# Patient Record
Sex: FEMALE | Race: WHITE | NOT HISPANIC OR LATINO | Employment: FULL TIME | URBAN - METROPOLITAN AREA
[De-identification: names, ages, dates, MRNs, and addresses within clinical notes are randomized per-mention and may not be internally consistent; named-entity substitution may affect disease eponyms.]

---

## 2023-10-03 ENCOUNTER — APPOINTMENT (OUTPATIENT)
Dept: ORTHOPEDIC SURGERY | Facility: CLINIC | Age: 33
End: 2023-10-03
Payer: COMMERCIAL

## 2023-10-03 PROBLEM — K21.9 ACID REFLUX: Status: ACTIVE | Noted: 2023-10-03

## 2023-10-03 PROBLEM — Z98.84 BARIATRIC SURGERY STATUS: Status: ACTIVE | Noted: 2023-10-03

## 2023-10-03 PROBLEM — D22.9 NUMEROUS MOLES: Status: ACTIVE | Noted: 2023-10-03

## 2023-10-03 PROBLEM — F33.9 DEPRESSION, RECURRENT (CMS-HCC): Status: ACTIVE | Noted: 2023-10-03

## 2023-10-03 PROBLEM — E88.810 METABOLIC SYNDROME: Status: ACTIVE | Noted: 2023-10-03

## 2023-10-03 PROBLEM — L03.90 CELLULITIS: Status: ACTIVE | Noted: 2023-10-03

## 2023-10-03 PROBLEM — S82.409A FIBULA FRACTURE: Status: ACTIVE | Noted: 2023-10-03

## 2023-10-03 PROBLEM — G97.1 HEADACHE FOLLOWING LUMBAR PUNCTURE: Status: ACTIVE | Noted: 2023-10-03

## 2023-10-03 PROBLEM — R63.2 POLYPHAGIA: Status: ACTIVE | Noted: 2023-10-03

## 2023-10-03 PROBLEM — H90.0 CONDUCTIVE HEARING LOSS OF BOTH EARS: Status: ACTIVE | Noted: 2023-10-03

## 2023-10-03 PROBLEM — R30.0 DYSURIA: Status: ACTIVE | Noted: 2023-10-03

## 2023-10-03 PROBLEM — S82.832A CLOSED FRACTURE OF LEFT DISTAL FIBULA: Status: ACTIVE | Noted: 2023-10-03

## 2023-10-03 PROBLEM — H66.40 OTITIS MEDIA, PURULENT: Status: ACTIVE | Noted: 2023-10-03

## 2023-10-03 PROBLEM — R78.79 HIGH BLOOD COPPER LEVEL: Status: ACTIVE | Noted: 2023-10-03

## 2023-10-03 PROBLEM — E55.9 VITAMIN D DEFICIENCY: Status: ACTIVE | Noted: 2023-10-03

## 2023-10-03 PROBLEM — S93.432A SYNDESMOTIC DISRUPTION OF LEFT ANKLE: Status: ACTIVE | Noted: 2023-10-03

## 2023-10-03 PROBLEM — G93.2 IIH (IDIOPATHIC INTRACRANIAL HYPERTENSION): Status: ACTIVE | Noted: 2023-10-03

## 2023-10-03 PROBLEM — R63.4 RAPID WEIGHT LOSS: Status: ACTIVE | Noted: 2023-10-03

## 2023-10-03 PROBLEM — S92.252A: Status: ACTIVE | Noted: 2023-10-03

## 2023-10-03 PROBLEM — G51.0 BELL'S PALSY: Status: ACTIVE | Noted: 2023-10-03

## 2023-10-03 PROBLEM — A59.9 TRICHOMONIASIS: Status: ACTIVE | Noted: 2023-10-03

## 2023-10-03 PROBLEM — S99.919A ANKLE INJURY: Status: ACTIVE | Noted: 2023-10-03

## 2023-10-03 PROBLEM — H60.90 OTITIS EXTERNA: Status: ACTIVE | Noted: 2023-10-03

## 2023-10-03 PROBLEM — S92.352A CLOSED DISPLACED FRACTURE OF FIFTH METATARSAL BONE OF LEFT FOOT: Status: ACTIVE | Noted: 2023-10-03

## 2023-10-03 PROBLEM — Z98.84 S/P LAPAROSCOPIC SLEEVE GASTRECTOMY: Status: ACTIVE | Noted: 2023-10-03

## 2023-10-03 PROBLEM — R29.818 SUSPECTED SLEEP APNEA: Status: ACTIVE | Noted: 2023-10-03

## 2023-10-03 PROBLEM — F41.1 GENERALIZED ANXIETY DISORDER: Status: ACTIVE | Noted: 2023-10-03

## 2023-10-03 PROBLEM — N64.52 BILATERAL NIPPLE DISCHARGE: Status: ACTIVE | Noted: 2023-10-03

## 2023-10-03 PROBLEM — S92.309A CLOSED FRACTURE OF METATARSAL BONE: Status: ACTIVE | Noted: 2023-10-03

## 2023-10-03 PROBLEM — K60.2 ANAL FISSURE: Status: ACTIVE | Noted: 2023-10-03

## 2023-10-03 PROBLEM — H47.10 OPTIC DISC EDEMA: Status: ACTIVE | Noted: 2023-10-03

## 2023-10-03 PROBLEM — R79.89 LOW SERUM PROLACTIN: Status: ACTIVE | Noted: 2023-10-03

## 2023-10-03 RX ORDER — MULTIVITAMIN
TABLET ORAL
COMMUNITY

## 2023-10-03 RX ORDER — OMEPRAZOLE 40 MG/1
1 CAPSULE, DELAYED RELEASE ORAL DAILY
COMMUNITY
Start: 2019-06-27 | End: 2023-10-25 | Stop reason: ALTCHOICE

## 2023-10-03 RX ORDER — SERTRALINE HYDROCHLORIDE 100 MG/1
1 TABLET, FILM COATED ORAL DAILY
COMMUNITY
Start: 2017-05-12 | End: 2023-10-25 | Stop reason: ALTCHOICE

## 2023-10-03 RX ORDER — NYSTATIN 100000 [USP'U]/G
POWDER TOPICAL
COMMUNITY
Start: 2021-05-11

## 2023-10-05 ENCOUNTER — OFFICE VISIT (OUTPATIENT)
Dept: ORTHOPEDIC SURGERY | Facility: CLINIC | Age: 33
End: 2023-10-05
Payer: COMMERCIAL

## 2023-10-05 DIAGNOSIS — S92.352A DISPLACED FRACTURE OF FIFTH METATARSAL BONE, LEFT FOOT, INITIAL ENCOUNTER FOR CLOSED FRACTURE: Primary | ICD-10-CM

## 2023-10-05 PROCEDURE — 99204 OFFICE O/P NEW MOD 45 MIN: CPT | Performed by: PHYSICIAN ASSISTANT

## 2023-10-05 PROCEDURE — 99214 OFFICE O/P EST MOD 30 MIN: CPT | Performed by: PHYSICIAN ASSISTANT

## 2023-10-05 NOTE — PATIENT INSTRUCTIONS
YOUR BOOT INSTRUCTIONS:  You CANNOT put weight on your foot and ankle while in the boot.    You can use crutches or walker for support   You should wear boot when walking or standing   You can take off your boot while bathing, sitting, stretching, icing or doing therapy exercises.  You can take your boot off at nighttime while sleeping.    You can also use OTC Voltaren gel or  aspercreme ointment and apply it to the injured area.      Ice and elevate supported at the calf to reduce swelling     Patient will increase their dietary calcium intake (milk,yogurt) and should get 1200 mg of calcium per day. They will also take a vitamin D supplement.    We have discussed your treatment options, risks and benefits and surgery for your condition was selected and recommended.  Additional laboratory studies, EKG and chest x-ray may need to be performed prior to surgery, particularly if you have chronic health problems.  We will give you an order to get further testing done if needed.  You may have to see your primary care doctor or the [facility] to be medically cleared (healthy enough to safely undergo surgery without further treatment for your medical conditions) prior to your surgery.  Please do not eat, drink, or chew anything after midnight the night before your surgery.  Please call Kate Chase at [phone] to schedule your surgery and if you have any further questions.    Follow up with Dr. Hicks

## 2023-10-05 NOTE — LETTER
October 10, 2023     Racquel Amaya DO  [address] [facility]  [address]  [facility] [address]    Patient: eLxie Hartmann   YOB: 1990   Date of Visit: 10/5/2023       Dear Dr. Racquel Amaya DO:    Thank you for referring Lexie Hartmann to me for evaluation. Below are my notes for this consultation.  If you have questions, please do not hesitate to call me. I look forward to following your patient along with you.       Sincerely,     Jarad Lin PA-C      CC: No Recipients  ______________________________________________________________________________________    NPV-   History of Present Illness  32 y.o.female  1. Displaced fracture of fifth metatarsal bone, left foot, initial encounter for closed fracture          Mechanism of injury: ankle/foot inversion on stairs  Date of Injury/Pain: 9/25/23  Location of pain: lateral ankle  Quality of pain:  7  Frequency of Pain: worse with walking or standing  Associated symptoms? Swelling.  Previous treatment?  Dr. Bustillo, xrays, walking boot    27 point review of systems negative except what is stated in HPI    On examination of the left ankle/foot:  Normal gait in boot  Normal alignment  Minimal swelling of the foot with ecchymosis. No erythema  Slight decreased ROM in plantarflexion, dorsiflexion, inversion and eversion and decrease 2nd through 5th toe ROM in extension and flexion  Normal strength in plantarflexion, dorsiflexion, inversion and eversion.  Tenderness to palpation: 5th metatarsal   No tenderness to palpation over the Achilles, medial malleolus, posterior tibial tendon, peroneal tendon, deltoid ligament, talus or navicular.  Neurovascularly intact.  No sensory deficit to light touch.  Dorsalis pedis and posterior tibial pulses 2+ bilaterally.  Negative proprioception testing.   - Cash's test                              - Dorsiflexion-eversion test  - Anterior Drawer test                        - Talar tilt test  - Squeeze test     I  personally reviewed the patient's x-ray images and reports of the left foot. The xrays show a displaced spiral fracture of the 5th metatarsal. No other fractures or dislocation.      ASSESSMENT: right displaced 5th metatarsal fracture    PLAN: Treatment options were discussed with the patient. Patient was placed in a tall CAM walker boot to be WBAT with crutches.  They were given boot instructions. Patient was given a handout and instructed on an at home stretching program.  They should do these exercises 3 times per day for 6 weeks and then daily. Patient can use OTC aspercream for pain and continue to ice and elevate supported at the calf to reduce swelling. All the patient's questions were answered. The patient agrees with the above plan.  Follow up with Dr. Hicks

## 2023-10-10 ENCOUNTER — PREP FOR PROCEDURE (OUTPATIENT)
Dept: ORTHOPEDIC SURGERY | Facility: CLINIC | Age: 33
End: 2023-10-10

## 2023-10-10 ENCOUNTER — OFFICE VISIT (OUTPATIENT)
Dept: ORTHOPEDIC SURGERY | Facility: CLINIC | Age: 33
End: 2023-10-10
Payer: COMMERCIAL

## 2023-10-10 DIAGNOSIS — S92.352A CLOSED DISPLACED FRACTURE OF FIFTH METATARSAL BONE OF LEFT FOOT, INITIAL ENCOUNTER: Primary | ICD-10-CM

## 2023-10-10 DIAGNOSIS — S92.352A DISPLACED FRACTURE OF FIFTH METATARSAL BONE, LEFT FOOT, INITIAL ENCOUNTER FOR CLOSED FRACTURE: Primary | ICD-10-CM

## 2023-10-10 PROCEDURE — 99214 OFFICE O/P EST MOD 30 MIN: CPT | Performed by: ORTHOPAEDIC SURGERY

## 2023-10-10 RX ORDER — SODIUM CHLORIDE 9 MG/ML
100 INJECTION, SOLUTION INTRAVENOUS CONTINUOUS
Status: CANCELLED | OUTPATIENT
Start: 2023-10-10

## 2023-10-10 NOTE — PROGRESS NOTES
NPV-   History of Present Illness  32 y.o.female  1. Displaced fracture of fifth metatarsal bone, left foot, initial encounter for closed fracture          Mechanism of injury: ankle/foot inversion on stairs  Date of Injury/Pain: 9/25/23  Location of pain: lateral ankle  Quality of pain:  7  Frequency of Pain: worse with walking or standing  Associated symptoms? Swelling.  Previous treatment?  Dr. Bustillo, xrays, walking boot    27 point review of systems negative except what is stated in HPI    On examination of the left ankle/foot:  Normal gait in boot  Normal alignment  Minimal swelling of the foot with ecchymosis. No erythema  Slight decreased ROM in plantarflexion, dorsiflexion, inversion and eversion and decrease 2nd through 5th toe ROM in extension and flexion  Normal strength in plantarflexion, dorsiflexion, inversion and eversion.  Tenderness to palpation: 5th metatarsal   No tenderness to palpation over the Achilles, medial malleolus, posterior tibial tendon, peroneal tendon, deltoid ligament, talus or navicular.  Neurovascularly intact.  No sensory deficit to light touch.  Dorsalis pedis and posterior tibial pulses 2+ bilaterally.  Negative proprioception testing.   - Cash's test                              - Dorsiflexion-eversion test  - Anterior Drawer test                        - Talar tilt test  - Squeeze test     I personally reviewed the patient's x-ray images and reports of the left foot. The xrays show a displaced spiral fracture of the 5th metatarsal. No other fractures or dislocation.      ASSESSMENT: right displaced 5th metatarsal fracture    PLAN: Treatment options were discussed with the patient. Patient was placed in a tall CAM walker boot to be WBAT with crutches.  They were given boot instructions. Patient was given a handout and instructed on an at home stretching program.  They should do these exercises 3 times per day for 6 weeks and then daily. Patient can use OTC aspercream for  pain and continue to ice and elevate supported at the calf to reduce swelling. All the patient's questions were answered. The patient agrees with the above plan.  Follow up with Dr. Hicks

## 2023-10-10 NOTE — PROGRESS NOTES
HISTORY OF PRESENT ILLNESS  This is a pleasant 32 y.o. year old female  who presents on 10/10/2023 at the request of Racquel Amaya DO for evaluation of left foot pain that has been present over/since  9/25/23 .    How the condition occurred or started: missed a step and twisted her left foot  Location of pain (patient points to): lateral foot left   Quality of pain: Moderate  Modifying Factors: worse with weightbearing  Associated Signs and symptoms: swelling  Previous Treatment: boot and knee walker  No other areas of injury    PHYSICAL EXAMINATION  Constitutional Exam: patient's height and weight reviewed, well-kempt  Psychiatric Exam: alert and oriented x 3, appropriate mood and behavior  Eye Exam: CORY, EOMI  Pulmonary Exam: breathing non-labored, no apparent distress  Lymphatic exam: no appreciable lymphadenopathy in the lower extremities  Cardiovascular exam: DP pulses 2+ bilaterally, PT 2+ bilaterally, toes are pink with good capillary refill, no pitting edema  Skin exam: no open lesions, rashes, abrasions or ulcerations  Neurological exam: sensation to light touch intact in both lower extremities in peripheral and dermatomal distributions (except for any abnormalities noted in musculoskeletal exam)    Musculoskeletal exam: left foot and ankle: sore to palpation over the 5th metatarsal where swelling located, no pain to palpation over distal fibula and medial malleolus, no pain over TMT or midfoot, neg shuck test, firm endpoint on anterior drawer test and talar tilt test, neg DF-eversion stress test, wiggles toes/DF/PF intact    DATA/RESULTS REVIEW: I personally reviewed the patient's x-ray images and reports of the the left foot and show significantly displaced 5th metatarsal neck and shaft fracture with comminution and significant shortening .     ASSESSMENT: left foot 5th metatarsal neck and shaft fracture with significant shortening and displacement  PLAN: I discussed with the patient the  differential diagnosis, complex traumatic related nature of the condition(s) and available treatment option(s).  Due to amount of displacement which is significant over 5 mm and significant shortening with malrotation of the toe, we would recommend open duction internal fixation of the fifth metatarsal neck and shaft fracture as likely there is muscle interposition in the fracture site which will prevent good healing.  Discussed with her the risks and benefits of nonoperative versus operative management in detail.  We discussed with her risks of surgery including pain, bleeding, infection, wound healing problems, bone healing problems, DVT PE, swelling, other medical complications.  She would like to proceed with surgical intervention.  She would have to be nonweightbearing on the left lower extremity with knee walker for at least 4 to 6 weeks after surgery and then transition to boot weightbearing after that.  Discussed with her that splint will be applied in the operating room after surgery is completed.  Discussed with her takes approximately 6 weeks to 8 weeks for bone healing.  Physical therapy will be needed to help get her back to good function and balance.  Outpatient procedure done under general anesthetic.  Popliteal block also can be done by anesthesia to decrease pain medication needed after surgery.  Reviewed with her the use of Percocet briefly only as needed for severe pain and we discussed DVT prophylaxis medications and decided on aspirin to start day 1 after surgery.  In the meantime she will continue using a knee walker and boot like a cast and have it on all the time.  She will contact my office to schedule.  The patient's questions were answered in detail.      I discussed with patient the procedure in detail, risks and benefits of procedure, post-op protocol, anesthetic used with possible regional block, timeline of recovery, need for protective weightbearing and/or bracing after procedure,  "pain management protocol, DVT prophylaxis protocol, home preparation suggestions, pre-op surgery preparation, and other pertinent information.    Note dictated with [employer] software, completed without full type editing to avoid delay.      Dear Referring Physician,  It was my pleasure to see your patient, in the office today.  Please refer to the detailed notes above for my findings and recommendations.  Thank you once again for your consultation request.  If you have any further questions or concerns, please feel free to contact me via email or at the phone number and address below.  Sincerely,    Milena Hicks MD   of Orthopedic Surgery, [employer]  Dual Fellowship-trained in Orthopedic Sports Medicine (Knee and Shoulder), and Foot and Ankle Surgery  The  [facility] [place]   ABOS Subspecialty Certificate in Orthopedic Sports Medicine  Head Team Physician Case \"[place]\" and [facility] \"Storm\"  Team [facility]OP Physician [id number] Paralympic Games  Team [place] Figure Skating Medical Practitioner, including International Event Coverage  Director, Foot and Ankle Division, Department of Orthopedics    [address], [place] [address]  [email or web address]  Office [phone]   "

## 2023-10-10 NOTE — H&P (VIEW-ONLY)
HISTORY OF PRESENT ILLNESS  This is a pleasant 32 y.o. year old female  who presents on 10/10/2023 at the request of Racquel Amaya DO for evaluation of left foot pain that has been present over/since  9/25/23 .    How the condition occurred or started: missed a step and twisted her left foot  Location of pain (patient points to): lateral foot left   Quality of pain: Moderate  Modifying Factors: worse with weightbearing  Associated Signs and symptoms: swelling  Previous Treatment: boot and knee walker  No other areas of injury    PHYSICAL EXAMINATION  Constitutional Exam: patient's height and weight reviewed, well-kempt  Psychiatric Exam: alert and oriented x 3, appropriate mood and behavior  Eye Exam: CORY, EOMI  Pulmonary Exam: breathing non-labored, no apparent distress  Lymphatic exam: no appreciable lymphadenopathy in the lower extremities  Cardiovascular exam: DP pulses 2+ bilaterally, PT 2+ bilaterally, toes are pink with good capillary refill, no pitting edema  Skin exam: no open lesions, rashes, abrasions or ulcerations  Neurological exam: sensation to light touch intact in both lower extremities in peripheral and dermatomal distributions (except for any abnormalities noted in musculoskeletal exam)    Musculoskeletal exam: left foot and ankle: sore to palpation over the 5th metatarsal where swelling located, no pain to palpation over distal fibula and medial malleolus, no pain over TMT or midfoot, neg shuck test, firm endpoint on anterior drawer test and talar tilt test, neg DF-eversion stress test, wiggles toes/DF/PF intact    DATA/RESULTS REVIEW: I personally reviewed the patient's x-ray images and reports of the the left foot and show significantly displaced 5th metatarsal neck and shaft fracture with comminution and significant shortening .     ASSESSMENT: left foot 5th metatarsal neck and shaft fracture with significant shortening and displacement  PLAN: I discussed with the patient the  differential diagnosis, complex traumatic related nature of the condition(s) and available treatment option(s).  Due to amount of displacement which is significant over 5 mm and significant shortening with malrotation of the toe, we would recommend open duction internal fixation of the fifth metatarsal neck and shaft fracture as likely there is muscle interposition in the fracture site which will prevent good healing.  Discussed with her the risks and benefits of nonoperative versus operative management in detail.  We discussed with her risks of surgery including pain, bleeding, infection, wound healing problems, bone healing problems, DVT PE, swelling, other medical complications.  She would like to proceed with surgical intervention.  She would have to be nonweightbearing on the left lower extremity with knee walker for at least 4 to 6 weeks after surgery and then transition to boot weightbearing after that.  Discussed with her that splint will be applied in the operating room after surgery is completed.  Discussed with her takes approximately 6 weeks to 8 weeks for bone healing.  Physical therapy will be needed to help get her back to good function and balance.  Outpatient procedure done under general anesthetic.  Popliteal block also can be done by anesthesia to decrease pain medication needed after surgery.  Reviewed with her the use of Percocet briefly only as needed for severe pain and we discussed DVT prophylaxis medications and decided on aspirin to start day 1 after surgery.  In the meantime she will continue using a knee walker and boot like a cast and have it on all the time.  She will contact my office to schedule.  The patient's questions were answered in detail.      I discussed with patient the procedure in detail, risks and benefits of procedure, post-op protocol, anesthetic used with possible regional block, timeline of recovery, need for protective weightbearing and/or bracing after procedure,  "pain management protocol, DVT prophylaxis protocol, home preparation suggestions, pre-op surgery preparation, and other pertinent information.    Note dictated with [employer] software, completed without full type editing to avoid delay.      Dear Referring Physician,  It was my pleasure to see your patient, in the office today.  Please refer to the detailed notes above for my findings and recommendations.  Thank you once again for your consultation request.  If you have any further questions or concerns, please feel free to contact me via email or at the phone number and address below.  Sincerely,    Milena Hicks MD   of Orthopedic Surgery, [facility] [employer]  Dual Fellowship-trained in Orthopedic Sports Medicine (Knee and Shoulder), and Foot and Ankle Surgery  The  [facility] [place]   ABOS Subspecialty Certificate in Orthopedic Sports Medicine  Head Team Physician Case \"[place]\" and [facility] \"Storm\"  Team [facility] Center[facility] Phys[facility] [id number] Paralympic Games  Team [place] Figure Skating Medical Practitioner, including International Event Coverage  Director, Foot and Ankle Division, Department of Orthopedics    [address], [place] [address]  [email or web address]  Office [phone]   "

## 2023-10-18 ENCOUNTER — APPOINTMENT (OUTPATIENT)
Dept: ORTHOPEDIC SURGERY | Facility: CLINIC | Age: 33
End: 2023-10-18
Payer: COMMERCIAL

## 2023-10-19 ENCOUNTER — PREP FOR PROCEDURE (OUTPATIENT)
Dept: ORTHOPEDIC SURGERY | Facility: HOSPITAL | Age: 33
End: 2023-10-19
Payer: COMMERCIAL

## 2023-10-19 DIAGNOSIS — S92.352A DISPLACED FRACTURE OF FIFTH METATARSAL BONE, LEFT FOOT, INITIAL ENCOUNTER FOR CLOSED FRACTURE: Primary | ICD-10-CM

## 2023-10-19 RX ORDER — SODIUM CHLORIDE 9 MG/ML
100 INJECTION, SOLUTION INTRAVENOUS CONTINUOUS
Status: CANCELLED | OUTPATIENT
Start: 2023-10-26

## 2023-10-26 ENCOUNTER — ANCILLARY PROCEDURE (OUTPATIENT)
Dept: RADIOLOGY | Facility: CLINIC | Age: 33
End: 2023-10-26
Payer: COMMERCIAL

## 2023-10-26 ENCOUNTER — HOSPITAL ENCOUNTER (OUTPATIENT)
Facility: CLINIC | Age: 33
Setting detail: OUTPATIENT SURGERY
Discharge: HOME | End: 2023-10-26
Attending: ORTHOPAEDIC SURGERY | Admitting: ORTHOPAEDIC SURGERY
Payer: COMMERCIAL

## 2023-10-26 ENCOUNTER — ANESTHESIA (OUTPATIENT)
Dept: OPERATING ROOM | Facility: CLINIC | Age: 33
End: 2023-10-26
Payer: COMMERCIAL

## 2023-10-26 ENCOUNTER — ANESTHESIA EVENT (OUTPATIENT)
Dept: OPERATING ROOM | Facility: CLINIC | Age: 33
End: 2023-10-26
Payer: COMMERCIAL

## 2023-10-26 VITALS
TEMPERATURE: 98.2 F | WEIGHT: 205.91 LBS | SYSTOLIC BLOOD PRESSURE: 118 MMHG | HEIGHT: 68 IN | DIASTOLIC BLOOD PRESSURE: 78 MMHG | BODY MASS INDEX: 31.21 KG/M2 | HEART RATE: 66 BPM | OXYGEN SATURATION: 100 % | RESPIRATION RATE: 18 BRPM

## 2023-10-26 DIAGNOSIS — S92.352A CLOSED DISPLACED FRACTURE OF FIFTH METATARSAL BONE OF LEFT FOOT, INITIAL ENCOUNTER: ICD-10-CM

## 2023-10-26 DIAGNOSIS — S92.352A DISPLACED FRACTURE OF FIFTH METATARSAL BONE, LEFT FOOT, INITIAL ENCOUNTER FOR CLOSED FRACTURE: Primary | ICD-10-CM

## 2023-10-26 LAB — PREGNANCY TEST URINE, POC: NEGATIVE

## 2023-10-26 PROCEDURE — 3600000004 HC OR TIME - INITIAL BASE CHARGE - PROCEDURE LEVEL FOUR: Performed by: ORTHOPAEDIC SURGERY

## 2023-10-26 PROCEDURE — 2500000005 HC RX 250 GENERAL PHARMACY W/O HCPCS: Performed by: NURSE ANESTHETIST, CERTIFIED REGISTERED

## 2023-10-26 PROCEDURE — C1713 ANCHOR/SCREW BN/BN,TIS/BN: HCPCS | Performed by: ORTHOPAEDIC SURGERY

## 2023-10-26 PROCEDURE — 2720000007 HC OR 272 NO HCPCS: Performed by: ORTHOPAEDIC SURGERY

## 2023-10-26 PROCEDURE — 76942 ECHO GUIDE FOR BIOPSY: CPT | Performed by: ANESTHESIOLOGY

## 2023-10-26 PROCEDURE — 3600000009 HC OR TIME - EACH INCREMENTAL 1 MINUTE - PROCEDURE LEVEL FOUR: Performed by: ORTHOPAEDIC SURGERY

## 2023-10-26 PROCEDURE — 2500000001 HC RX 250 WO HCPCS SELF ADMINISTERED DRUGS (ALT 637 FOR MEDICARE OP): Performed by: NURSE ANESTHETIST, CERTIFIED REGISTERED

## 2023-10-26 PROCEDURE — 76000 FLUOROSCOPY <1 HR PHYS/QHP: CPT | Mod: RSC

## 2023-10-26 PROCEDURE — 7100000002 HC RECOVERY ROOM TIME - EACH INCREMENTAL 1 MINUTE: Performed by: ORTHOPAEDIC SURGERY

## 2023-10-26 PROCEDURE — 2500000004 HC RX 250 GENERAL PHARMACY W/ HCPCS (ALT 636 FOR OP/ED): Performed by: ORTHOPAEDIC SURGERY

## 2023-10-26 PROCEDURE — 3700000001 HC GENERAL ANESTHESIA TIME - INITIAL BASE CHARGE: Performed by: ORTHOPAEDIC SURGERY

## 2023-10-26 PROCEDURE — 7100000009 HC PHASE TWO TIME - INITIAL BASE CHARGE: Performed by: ORTHOPAEDIC SURGERY

## 2023-10-26 PROCEDURE — C1769 GUIDE WIRE: HCPCS | Performed by: ORTHOPAEDIC SURGERY

## 2023-10-26 PROCEDURE — 2780000003 HC OR 278 NO HCPCS: Performed by: ORTHOPAEDIC SURGERY

## 2023-10-26 PROCEDURE — 3700000002 HC GENERAL ANESTHESIA TIME - EACH INCREMENTAL 1 MINUTE: Performed by: ORTHOPAEDIC SURGERY

## 2023-10-26 PROCEDURE — 2500000004 HC RX 250 GENERAL PHARMACY W/ HCPCS (ALT 636 FOR OP/ED): Performed by: NURSE ANESTHETIST, CERTIFIED REGISTERED

## 2023-10-26 PROCEDURE — 81025 URINE PREGNANCY TEST: CPT | Performed by: ORTHOPAEDIC SURGERY

## 2023-10-26 PROCEDURE — A28485 PR OPEN TREATMENT METATARSAL FRACTURE EACH: Performed by: NURSE ANESTHETIST, CERTIFIED REGISTERED

## 2023-10-26 PROCEDURE — 2500000001 HC RX 250 WO HCPCS SELF ADMINISTERED DRUGS (ALT 637 FOR MEDICARE OP): Performed by: ANESTHESIOLOGY

## 2023-10-26 PROCEDURE — 2500000004 HC RX 250 GENERAL PHARMACY W/ HCPCS (ALT 636 FOR OP/ED): Performed by: ANESTHESIOLOGY

## 2023-10-26 PROCEDURE — A28485 PR OPEN TREATMENT METATARSAL FRACTURE EACH: Performed by: ANESTHESIOLOGY

## 2023-10-26 PROCEDURE — 7100000010 HC PHASE TWO TIME - EACH INCREMENTAL 1 MINUTE: Performed by: ORTHOPAEDIC SURGERY

## 2023-10-26 PROCEDURE — 28485 OPTX METATARSAL FX EACH: CPT | Performed by: ORTHOPAEDIC SURGERY

## 2023-10-26 PROCEDURE — 7100000001 HC RECOVERY ROOM TIME - INITIAL BASE CHARGE: Performed by: ORTHOPAEDIC SURGERY

## 2023-10-26 DEVICE — IMPLANTABLE DEVICE: Type: IMPLANTABLE DEVICE | Site: FOOT | Status: FUNCTIONAL

## 2023-10-26 DEVICE — IMPLANTABLE DEVICE: Type: IMPLANTABLE DEVICE | Site: FOOT | Status: NON-FUNCTIONAL

## 2023-10-26 RX ORDER — FENTANYL CITRATE 50 UG/ML
INJECTION, SOLUTION INTRAMUSCULAR; INTRAVENOUS AS NEEDED
Status: DISCONTINUED | OUTPATIENT
Start: 2023-10-26 | End: 2023-10-26

## 2023-10-26 RX ORDER — LIDOCAINE HYDROCHLORIDE 20 MG/ML
INJECTION, SOLUTION INFILTRATION; PERINEURAL AS NEEDED
Status: DISCONTINUED | OUTPATIENT
Start: 2023-10-26 | End: 2023-10-26

## 2023-10-26 RX ORDER — CELECOXIB 200 MG/1
CAPSULE ORAL AS NEEDED
Status: DISCONTINUED | OUTPATIENT
Start: 2023-10-26 | End: 2023-10-26

## 2023-10-26 RX ORDER — OXYCODONE HYDROCHLORIDE 5 MG/1
5 TABLET ORAL EVERY 4 HOURS PRN
Status: DISCONTINUED | OUTPATIENT
Start: 2023-10-26 | End: 2023-10-26 | Stop reason: HOSPADM

## 2023-10-26 RX ORDER — DOCUSATE SODIUM 100 MG/1
100 CAPSULE, LIQUID FILLED ORAL 2 TIMES DAILY PRN
Qty: 20 CAPSULE | Refills: 0 | Status: SHIPPED | OUTPATIENT
Start: 2023-10-26 | End: 2023-11-10

## 2023-10-26 RX ORDER — ASPIRIN 81 MG/1
81 TABLET ORAL 2 TIMES DAILY
Qty: 84 TABLET | Refills: 0 | Status: SHIPPED | OUTPATIENT
Start: 2023-10-26 | End: 2023-12-07

## 2023-10-26 RX ORDER — SODIUM CHLORIDE, SODIUM LACTATE, POTASSIUM CHLORIDE, CALCIUM CHLORIDE 600; 310; 30; 20 MG/100ML; MG/100ML; MG/100ML; MG/100ML
100 INJECTION, SOLUTION INTRAVENOUS CONTINUOUS
Status: DISCONTINUED | OUTPATIENT
Start: 2023-10-26 | End: 2023-10-26 | Stop reason: HOSPADM

## 2023-10-26 RX ORDER — PROPOFOL 10 MG/ML
INJECTION, EMULSION INTRAVENOUS CONTINUOUS PRN
Status: DISCONTINUED | OUTPATIENT
Start: 2023-10-26 | End: 2023-10-26

## 2023-10-26 RX ORDER — OXYCODONE AND ACETAMINOPHEN 5; 325 MG/1; MG/1
1 TABLET ORAL EVERY 6 HOURS PRN
Qty: 20 TABLET | Refills: 0 | Status: SHIPPED | OUTPATIENT
Start: 2023-10-26 | End: 2023-11-10

## 2023-10-26 RX ORDER — ALBUTEROL SULFATE 0.83 MG/ML
2.5 SOLUTION RESPIRATORY (INHALATION) ONCE AS NEEDED
Status: DISCONTINUED | OUTPATIENT
Start: 2023-10-26 | End: 2023-10-26 | Stop reason: HOSPADM

## 2023-10-26 RX ORDER — LIDOCAINE HYDROCHLORIDE 10 MG/ML
0.1 INJECTION, SOLUTION EPIDURAL; INFILTRATION; INTRACAUDAL; PERINEURAL ONCE
Status: DISCONTINUED | OUTPATIENT
Start: 2023-10-26 | End: 2023-10-26 | Stop reason: HOSPADM

## 2023-10-26 RX ORDER — PROPOFOL 10 MG/ML
INJECTION, EMULSION INTRAVENOUS AS NEEDED
Status: DISCONTINUED | OUTPATIENT
Start: 2023-10-26 | End: 2023-10-26

## 2023-10-26 RX ORDER — ONDANSETRON HYDROCHLORIDE 2 MG/ML
4 INJECTION, SOLUTION INTRAVENOUS ONCE AS NEEDED
Status: DISCONTINUED | OUTPATIENT
Start: 2023-10-26 | End: 2023-10-26 | Stop reason: HOSPADM

## 2023-10-26 RX ORDER — HYDROMORPHONE HYDROCHLORIDE 1 MG/ML
0.4 INJECTION, SOLUTION INTRAMUSCULAR; INTRAVENOUS; SUBCUTANEOUS EVERY 5 MIN PRN
Status: DISCONTINUED | OUTPATIENT
Start: 2023-10-26 | End: 2023-10-26 | Stop reason: HOSPADM

## 2023-10-26 RX ORDER — GABAPENTIN 300 MG/1
CAPSULE ORAL AS NEEDED
Status: DISCONTINUED | OUTPATIENT
Start: 2023-10-26 | End: 2023-10-26

## 2023-10-26 RX ORDER — MIDAZOLAM HYDROCHLORIDE 1 MG/ML
INJECTION, SOLUTION INTRAMUSCULAR; INTRAVENOUS AS NEEDED
Status: DISCONTINUED | OUTPATIENT
Start: 2023-10-26 | End: 2023-10-26

## 2023-10-26 RX ORDER — ACETAMINOPHEN 325 MG/1
TABLET ORAL AS NEEDED
Status: DISCONTINUED | OUTPATIENT
Start: 2023-10-26 | End: 2023-10-26

## 2023-10-26 RX ORDER — CEFAZOLIN SODIUM 2 G/50ML
2 SOLUTION INTRAVENOUS ONCE
Status: COMPLETED | OUTPATIENT
Start: 2023-10-26 | End: 2023-10-26

## 2023-10-26 RX ORDER — ONDANSETRON HYDROCHLORIDE 2 MG/ML
INJECTION, SOLUTION INTRAVENOUS AS NEEDED
Status: DISCONTINUED | OUTPATIENT
Start: 2023-10-26 | End: 2023-10-26

## 2023-10-26 RX ORDER — ONDANSETRON 4 MG/1
8 TABLET, FILM COATED ORAL EVERY 8 HOURS PRN
Qty: 12 TABLET | Refills: 0 | Status: SHIPPED | OUTPATIENT
Start: 2023-10-26 | End: 2023-11-10

## 2023-10-26 RX ORDER — CEFAZOLIN 1 G/1
INJECTION, POWDER, FOR SOLUTION INTRAVENOUS AS NEEDED
Status: DISCONTINUED | OUTPATIENT
Start: 2023-10-26 | End: 2023-10-26

## 2023-10-26 RX ADMIN — CEFAZOLIN SODIUM 2 G: 2 SOLUTION INTRAVENOUS at 08:20

## 2023-10-26 RX ADMIN — CELECOXIB 200 MG: 200 CAPSULE ORAL at 07:32

## 2023-10-26 RX ADMIN — ACETAMINOPHEN 975 MG: 325 TABLET ORAL at 07:32

## 2023-10-26 RX ADMIN — OXYCODONE 5 MG: 5 TABLET ORAL at 10:53

## 2023-10-26 RX ADMIN — MIDAZOLAM 2 MG: 1 INJECTION INTRAMUSCULAR; INTRAVENOUS at 07:40

## 2023-10-26 RX ADMIN — FENTANYL CITRATE 50 MCG: 50 INJECTION, SOLUTION INTRAMUSCULAR; INTRAVENOUS at 07:52

## 2023-10-26 RX ADMIN — PROPOFOL 50 MG: 10 INJECTION, EMULSION INTRAVENOUS at 09:53

## 2023-10-26 RX ADMIN — PROPOFOL 50 MCG/KG/MIN: 10 INJECTION, EMULSION INTRAVENOUS at 08:25

## 2023-10-26 RX ADMIN — DEXAMETHASONE SODIUM PHOSPHATE 4 MG: 4 INJECTION, SOLUTION INTRAMUSCULAR; INTRAVENOUS at 08:20

## 2023-10-26 RX ADMIN — HYDROMORPHONE HYDROCHLORIDE 0.4 MG: 1 INJECTION, SOLUTION INTRAMUSCULAR; INTRAVENOUS; SUBCUTANEOUS at 10:20

## 2023-10-26 RX ADMIN — SODIUM CHLORIDE, SODIUM LACTATE, POTASSIUM CHLORIDE, AND CALCIUM CHLORIDE: .6; .31; .03; .02 INJECTION, SOLUTION INTRAVENOUS at 08:10

## 2023-10-26 RX ADMIN — FENTANYL CITRATE 50 MCG: 50 INJECTION, SOLUTION INTRAMUSCULAR; INTRAVENOUS at 08:16

## 2023-10-26 RX ADMIN — PROPOFOL 30 MG: 10 INJECTION, EMULSION INTRAVENOUS at 09:54

## 2023-10-26 RX ADMIN — HYDROMORPHONE HYDROCHLORIDE 0.2 MG: 0.2 INJECTION, SOLUTION INTRAMUSCULAR; INTRAVENOUS; SUBCUTANEOUS at 10:38

## 2023-10-26 RX ADMIN — ONDANSETRON 4 MG: 2 INJECTION INTRAMUSCULAR; INTRAVENOUS at 09:34

## 2023-10-26 RX ADMIN — PROPOFOL 200 MG: 10 INJECTION, EMULSION INTRAVENOUS at 08:16

## 2023-10-26 RX ADMIN — GABAPENTIN 300 MG: 300 CAPSULE ORAL at 07:32

## 2023-10-26 RX ADMIN — LIDOCAINE HYDROCHLORIDE 100 MG: 20 INJECTION, SOLUTION INFILTRATION; PERINEURAL at 08:16

## 2023-10-26 SDOH — HEALTH STABILITY: MENTAL HEALTH: CURRENT SMOKER: 0

## 2023-10-26 ASSESSMENT — COLUMBIA-SUICIDE SEVERITY RATING SCALE - C-SSRS
1. IN THE PAST MONTH, HAVE YOU WISHED YOU WERE DEAD OR WISHED YOU COULD GO TO SLEEP AND NOT WAKE UP?: NO
2. HAVE YOU ACTUALLY HAD ANY THOUGHTS OF KILLING YOURSELF?: NO
6. HAVE YOU EVER DONE ANYTHING, STARTED TO DO ANYTHING, OR PREPARED TO DO ANYTHING TO END YOUR LIFE?: NO

## 2023-10-26 ASSESSMENT — PAIN - FUNCTIONAL ASSESSMENT
PAIN_FUNCTIONAL_ASSESSMENT: 0-10
PAIN_FUNCTIONAL_ASSESSMENT: 0-10

## 2023-10-26 ASSESSMENT — PAIN SCALES - GENERAL
PAINLEVEL_OUTOF10: 6
PAINLEVEL_OUTOF10: 6
PAINLEVEL_OUTOF10: 3
PAINLEVEL_OUTOF10: 8
PAINLEVEL_OUTOF10: 8
PAINLEVEL_OUTOF10: 0 - NO PAIN
PAINLEVEL_OUTOF10: 3
PAIN_LEVEL: 0
PAINLEVEL_OUTOF10: 3
PAINLEVEL_OUTOF10: 8

## 2023-10-26 NOTE — OP NOTE
left foot ORIF of 5th metatarsal shaft and neck fracture (L) Operative Note     Date: 10/26/2023  OR Location: [facility] OR    Name: Lexie Hartmann, : 1990, Age: 32 y.o., MRN: [id number], Sex: female    Diagnosis  Pre-op Diagnosis     * Closed displaced fracture of fifth metatarsal bone of left foot, initial encounter [S92.352A] Post-op Diagnosis     * Closed displaced fracture of fifth metatarsal bone of left foot, initial encounter [S92.352A]     Procedures  left foot ORIF of 5th metatarsal shaft and neck fracture  29818 - ME OPEN TREATMENT METATARSAL FRACTURE EACH      Surgeons      * Milena Hicks - Primary    Resident/Fellow/Other Assistant:  Surgeon(s) and Role:    Procedure Summary  Anesthesia: Regional  ASA: I  Anesthesia Staff: Anesthesiologist: Sarabjit Jeff MD  CRNA: Zenia Mims, APRN-CRNA  C-AA: JAVY Bonilla  Estimated Blood Loss: less 5mL  Intra-op Medications:   Medication Name Total Dose   ceFAZolin (Ancef) 2 g IV in dextrose 5% 50 mL 2 g              Anesthesia Record               Intraprocedure I/O Totals          Intake    .00 mL    Propofol Drip 0.00 mL    The total shown is the total volume documented since Anesthesia Start was filed.    Total Intake 600 mL          Specimen: No specimens collected     Staff:   Circulator: Boo Salomon RN  Scrub Person: Maria Del Rosario Duque         Drains and/or Catheters: * None in log *    Tourniquet Times:     Total Tourniquet Time Documented:  Thigh (Left) - 73 minutes  Total: Thigh (Left) - 73 minutes      Implants:  Implants       Type Name Action Serial No.      Screw SCREW, BG NON LOCKING, 2.0 X 16MM - [id number] Used, Not Implanted               Indications: Lexie Hartmann is an 32 y.o. female who is having surgery for Closed displaced fracture of fifth metatarsal bone of left foot, initial encounter [S92.352A].  Imaging revealed significantly displaced 5th metatarsal shaft and neck fracture.  After discussing the  alternatives of treatment in detail with the patient, the patient selected surgical intervention and/or reconstruction.  We discussed with the patient risks and benefits of the procedure(s).  Risks of surgery were reviewed including pain, bleeding, infection, wound healing problems, soft tissue or bone healing problems, injury to nerves or vessels, implant or hardware related complications, chronic extremity stiffness or pain or swelling, post-traumatic arthritis, recurrent symptoms, DVT, PE and other medical complications.  After the patient's questions were answered in detail including post-operative course requiring nonweightbearing initially and the extensive rehabilitation needed after surgery, the patient then gave informed consent for the procedure.    DESCRIPTION OF PROCEDURE  The patient was identified in the pre-operative area and the surgical extremity was marked with a skin marker to designate surgical site.  Anesthesia consult placed popliteal block without complication.  Patient then was brought back to the operating room.  A huddle was called and confirmed upon entry into the operating room as per protocol.  Time out was called and confirmed prior to skin incision.  General anesthetic was administered and LMA placed without complication.  Patient received IV ancef prior to skin incision as per protocol.  DVT prophylaxis was performed using stocking and SCD application on well leg.  A well-padded tourniquet was placed on the operative extremity and was elevated to 280mmHg for 73 minutes during the case.  The patient then was carefully positioned very slight lateral with bump underneath the left buttock with all superficial nerve areas, and bony prominences well-padded and protected during the case.  We then proceeded to prep and drape in the usual standard sterile fashion.    We then identified the anatomical landmarks of the 5th metatarsal and fracture site.  Longitudinal incision made with 15 blade.   Bipolar cautery used for hemostasis.  Capsule and fascia identified and then released longitudinally with [place] blade and thick subperiosteal flaps elevated to expose the fracture site.  Extensive scar tissue and callus and interposed muscle tissue was removed from fracture site.  Mild comminution noted.  Once main fracture line was prepared with currettes down to bleeding bone, we then irrigated and directly reduced the fracture and held it in place with reduction clamps.  We then used mini fluoroscopy during the case to confirm anatomical reduction and we confirmed appropriate toe rotation.  We then placed 2.0 mm lag screw with standard technique from plantar to dorsal and obtained excellent bony purchase and compression across fracture line.  We then selected a 6-hole L-shaped plate and contoured it to fit the 5th metatarsal.  We then used a tack to hole plate in place and confirmed position radiographically.  We then proceeded with nonlocking screw proximal and placed 2.0mm screw with excellent bony purchase.  We then placed nonlocking and locking screws distally using drill guides.  We then filled in the proximal screw holes with locking and nonlocking screws.  At completion, appropriate position of hardware and fracture alignment was confirmed clinically and radiographically.  We then proceeded with copious irrigation.  We then closed deep layer with interrupted 2-0 vicryl mattress sutures.  We then closed the subdermal tissue after irrigation with 4-0 vicryl sutures.  Skin was then closed with 4-0 nylon.      Tourniquet was released and all toes were pink and warm and distal pulses intact.  Dressing including xeroform, fluffs, ABD's, soft roll and well-padded short leg sugar tong and posterior splint was applied.  Dressing completed with ace wraps.  The patient was transported to the PACU in stable condition.  Patient fulfilled post-procedure discharge criteria and was released home.  Patient and patient  representatives were given detailed discharge instructions and home-going medications including Percocet for severe pain only, Zofran, Senna/Colace and DVT prophylaxis with enteric-coated aspirin.  Patient will be non-weightbearing on their operative extremity.  Dressing will be kept in place until follow-up with us in 10-14 days.  We discussed with the patient the different medications available for DVT prophylaxis and after discussion of risks and benefits the patient selected the above.  Findings were discussed with the patient and their representative after the procedure and questions were answered in detail.      Complications:  None; patient tolerated the procedure well.    Disposition: PACU - hemodynamically stable.  Condition: stable     Attending Attestation: I was present and scrubbed for the entire procedure.    Milena Hicks  Phone Number: [phone]

## 2023-10-26 NOTE — DISCHARGE INSTRUCTIONS
NWB operative extremity. Keep splint clean and dry. Encourage elevation of the extremity.     Tylenol given at 7:30am; next dose of Tylenol OR Percocet can be taken at 1:30pm if needed.    Celebrex given at 7:30am; Motrin/Ibuprofen can be taken tomorrow at 7:30am if needed.

## 2023-10-26 NOTE — ANESTHESIA PROCEDURE NOTES
Airway  Date/Time: 10/26/2023 8:17 AM  Urgency: elective    Airway not difficult    Staffing  Performed: CRNA   Authorized by: ALL Coy    Performed by: ALL Coy  Patient location during procedure: OR    Indications and Patient Condition  Indications for airway management: anesthesia  Sedation level: deep  Preoxygenated: yes  Mask difficulty assessment: 1 - vent by mask    Final Airway Details  Final airway type: supraglottic airway      Successful airway: Supraglottic airway: i-gel.  Size 4     Number of attempts at approach: 1

## 2023-10-26 NOTE — ANESTHESIA POSTPROCEDURE EVALUATION
Patient: Lexie Hartmann    Procedure Summary       Date: 10/26/23 [facility]    Anesthesia Start: 0810 Anesthesia Stop: 1005    Procedure: left foot ORIF of 5th metatarsal shaft and neck fracture (Left: Foot) Diagnosis:       Closed displaced fracture of fifth metatarsal bone of left foot, initial encounter      (Closed displaced fracture of fifth metatarsal bone of left foot, initial encounter [S92.352A])    Surgeons: Milena Hicks MD Responsible Provider: Sarabjit Jeff MD    Anesthesia Type: general, regional ASA Status: 1            Anesthesia Type: general, regional    Vitals Value Taken Time   /85 10/26/23 1020   Temp 36.5 10/26/23 1020   Pulse 94 10/26/23 1020   Resp 20 10/26/23 1020   SpO2 97 10/26/23 1020       Anesthesia Post Evaluation    Patient location during evaluation: PACU  Patient participation: complete - patient participated  Level of consciousness: awake  Pain score: 0  Pain management: adequate  Cardiovascular status: acceptable  Respiratory status: acceptable        No notable events documented.

## 2023-10-26 NOTE — BRIEF OP NOTE
Date: 10/26/2023  [facility]    Name: Lexie Hartmann, : 1990, Age: 32 y.o., MRN: [id number], Sex: female    Diagnosis  Pre-op Diagnosis     * Closed displaced fracture of fifth metatarsal bone of left foot, initial encounter [S92.352A] Post-op Diagnosis     * Closed displaced fracture of fifth metatarsal bone of left foot, initial encounter [S92.352A]     Procedures  left foot ORIF of 5th metatarsal shaft and neck fracture  53356 - HI OPEN TREATMENT METATARSAL FRACTURE EACH      Surgeons      * Milena Hicks - Primary    Resident/Fellow/Other Assistant:  Surgeon(s) and Role:    Procedure Summary  Anesthesia: Regional  ASA: I  Anesthesia Staff: Anesthesiologist: Sarabjit Jeff MD  CRNA: VICENTE Coy-CRNA  C-AA: JAVY Bonilla  Estimated Blood Loss: 0 mL  Intra-op Medications:   Medication Name Total Dose   ceFAZolin (Ancef) 2 g IV in dextrose 5% 50 mL 2 g              Anesthesia Record               Intraprocedure I/O Totals          Intake    .00 mL    Propofol Drip 0.00 mL    The total shown is the total volume documented since Anesthesia Start was filed.    Total Intake 600 mL          Specimen: No specimens collected     Staff:   Circulator: Boo Salomon RN  Scrub Person: Maria Del Rosario Duque          Findings: consistent with pre op diagnosis     Complications:  None; patient tolerated the procedure well.     Disposition: PACU - hemodynamically stable.  Condition: stable  Specimens Collected: No specimens collected  Attending Attestation: I was present and scrubbed for the entire procedure.    Milena Hicks  Phone Number: [phone]

## 2023-10-26 NOTE — ANESTHESIA PROCEDURE NOTES
Peripheral Block    Patient location during procedure: pre-op  Start time: 10/26/2023 7:45 AM  End time: 10/26/2023 7:58 AM  Reason for block: procedure for pain and at surgeon's request  Staffing  Performed: attending   Authorized by: Sarabjit Jeff MD    Performed by: Sarabjit Jeff MD  Preanesthetic Checklist  Completed: patient identified, IV checked, site marked, risks and benefits discussed, surgical consent, monitors and equipment checked, pre-op evaluation and timeout performed   Timeout performed at: 10/26/2023 7:40 AM  Peripheral Block  Patient position: laying flat  Prep: ChloraPrep  Patient monitoring: heart rate  Block type: popliteal  Laterality: left  Injection technique: single-shot  Guidance: nerve stimulator and ultrasound guided  Local infiltration: lidocaine  Infiltration strength: 1 %  Dose: 3 mL  Needle  Needle gauge: 22 G  Needle length: 8 cm  Needle localization: nerve stimulator, ultrasound guidance and anatomical landmarks  Test dose: negative  Assessment  Injection assessment: negative aspiration for heme, no paresthesia on injection, incremental injection and local visualized surrounding nerve on ultrasound  Paresthesia pain: none  Heart rate change: no  Slow fractionated injection: yes  Additional Notes  First Attempt by (  ). Personally performed Single shot nerve block performed under direct ultrasound guidance.  Site cleaned with chloraprep x 2.  Procedure done under strict sterile technique.  Skin localized with 1% Lidocaine.  Appropriate structures identified with ultrasound imaging.   80 mm PAJUNK needle inserted under US visualization.   10 ml 2% Lidocaine with epi 1:200K +20 ml  0.5% Ropivacaine with epi 1:200K injected under direct ultrasound guidance.  Serial aspirations every 5ml.  Aspirations negative for heme.  Negative paresthesias.  Patient tolerated procedure well without immediate complications.    Good motor sensory changes noted prior to  induction.

## 2023-10-26 NOTE — DISCHARGE SUMMARY
Discharge Diagnosis  Closed displaced fracture of fifth metatarsal bone of left foot    Issues Requiring Follow-Up  NONE    Test Results Pending At Discharge  Pending Labs       No current pending labs.            Hospital Course   SAME DAY SURGERY, DISCHARGE SUMMARY ENTERED IN ERROR.     Pertinent Physical Exam At Time of Discharge  Physical Exam    Home Medications     Medication List      START taking these medications     aspirin 81 mg EC tablet; Take 1 tablet (81 mg) by mouth 2 times a day.   docusate sodium 100 mg capsule; Commonly known as: Colace; Take 1   capsule (100 mg) by mouth 2 times a day as needed for constipation (while   taking percocet).   ondansetron 4 mg tablet; Commonly known as: Zofran; Take 2 tablets (8   mg) by mouth every 8 hours if needed for nausea or vomiting.   oxyCODONE-acetaminophen 5-325 mg tablet; Commonly known as: Percocet;   Take 1 tablet by mouth every 6 hours if needed for severe pain (7 - 10).     CONTINUE taking these medications     CALCIUM CARBONATE-VITAMIN D3 ORAL   multivitamin tablet   Nystop 100,000 unit/gram powder; Generic drug: nystatin       Outpatient Follow-Up  No future appointments.    Trace Gautam, DO

## 2023-10-26 NOTE — ANESTHESIA PREPROCEDURE EVALUATION
Patient: Lexie Hartmann    Procedure Information       Date/Time: 10/26/23 0730    Procedure: left foot ORIF of 5th metatarsal shaft and neck fracture (Left: Foot)    Location: [facility] OR 02 / [facility] OR    Surgeons: Milean Hicks MD            Relevant Problems   Anesthesia (within normal limits)      Cardiovascular (within normal limits)      Endocrine (within normal limits)      GI   (+) Acid reflux      /Renal (within normal limits)      Neuro/Psych   (+) Bell's palsy   (+) Depression, recurrent (CMS/HCC)   (+) Generalized anxiety disorder   (+) IIH (idiopathic intracranial hypertension)      Pulmonary (within normal limits)      GI/Hepatic (within normal limits)      Hematology (within normal limits)      Musculoskeletal (within normal limits)      Eyes, Ears, Nose, and Throat   (+) Conductive hearing loss of both ears      Infectious Disease   (+) Otitis media, purulent   (+) Trichomoniasis       Clinical information reviewed:   Tobacco  Allergies  Meds   Med Hx  Surg Hx  OB Status  Fam Hx  Soc   Hx        NPO Detail:  NPO/Void Status  Date of Last Liquid: 10/25/23  Time of Last Liquid: 1900  Date of Last Solid: 10/25/23  Time of Last Solid: 2000  Last Intake Type: Clear fluids         Physical Exam    Airway  Mallampati: I  TM distance: >3 FB  Neck ROM: full     Cardiovascular   Rhythm: regular  Rate: normal     Dental - normal exam     Pulmonary   Breath sounds clear to auscultation     Abdominal            Anesthesia Plan    ASA 1     general and regional     The patient is not a current smoker.    intravenous induction   Anesthetic plan and risks discussed with patient.  Use of blood products discussed with patient who.    Plan discussed with attending and resident.

## 2023-11-07 ENCOUNTER — OFFICE VISIT (OUTPATIENT)
Dept: ORTHOPEDIC SURGERY | Facility: CLINIC | Age: 33
End: 2023-11-07
Payer: COMMERCIAL

## 2023-11-07 DIAGNOSIS — S92.352A DISPLACED FRACTURE OF FIFTH METATARSAL BONE, LEFT FOOT, INITIAL ENCOUNTER FOR CLOSED FRACTURE: Primary | ICD-10-CM

## 2023-11-07 PROCEDURE — 1036F TOBACCO NON-USER: CPT | Performed by: ORTHOPAEDIC SURGERY

## 2023-11-07 PROCEDURE — 99024 POSTOP FOLLOW-UP VISIT: CPT | Performed by: ORTHOPAEDIC SURGERY

## 2023-11-07 NOTE — PROGRESS NOTES
Patient comes in for post-op of left foot, NWB in cast and crutches.    Physical Exam:  the left foot : incisions clean/dry intact, calf soft and supple, toes pink and warm with good capillary refill, pulses intact, limb swelling appropriate, wiggles toes    Assessment: left foot 5th metatarsal ORIF 10/26/23  Plan:  - Weightbearing instructions and restrictions discussed with patient  - Placed in well padded fiberglass non weightbearing cast  - DVT prophylaxis continue ASA  - follow-up visit 1 week for suture removal  - PT orders placed, instructed patient to call ahead to schedule appointment  Patient's questions were answered in detail and they are agreeable to above plan.

## 2023-11-10 ENCOUNTER — OFFICE VISIT (OUTPATIENT)
Dept: PRIMARY CARE | Facility: CLINIC | Age: 33
End: 2023-11-10
Payer: COMMERCIAL

## 2023-11-10 VITALS — SYSTOLIC BLOOD PRESSURE: 124 MMHG | DIASTOLIC BLOOD PRESSURE: 74 MMHG

## 2023-11-10 DIAGNOSIS — F33.9 DEPRESSION, RECURRENT (CMS-HCC): Primary | ICD-10-CM

## 2023-11-10 DIAGNOSIS — F41.9 ANXIETY: ICD-10-CM

## 2023-11-10 PROCEDURE — 99213 OFFICE O/P EST LOW 20 MIN: CPT | Performed by: FAMILY MEDICINE

## 2023-11-10 PROCEDURE — 3078F DIAST BP <80 MM HG: CPT | Performed by: FAMILY MEDICINE

## 2023-11-10 PROCEDURE — 1036F TOBACCO NON-USER: CPT | Performed by: FAMILY MEDICINE

## 2023-11-10 PROCEDURE — 3074F SYST BP LT 130 MM HG: CPT | Performed by: FAMILY MEDICINE

## 2023-11-10 RX ORDER — ESCITALOPRAM OXALATE 10 MG/1
10 TABLET ORAL DAILY
Qty: 30 TABLET | Refills: 1 | Status: SHIPPED | OUTPATIENT
Start: 2023-11-10 | End: 2024-01-16 | Stop reason: SDUPTHER

## 2023-11-10 ASSESSMENT — ANXIETY QUESTIONNAIRES
7. FEELING AFRAID AS IF SOMETHING AWFUL MIGHT HAPPEN: NEARLY EVERY DAY
6. BECOMING EASILY ANNOYED OR IRRITABLE: NEARLY EVERY DAY
2. NOT BEING ABLE TO STOP OR CONTROL WORRYING: NEARLY EVERY DAY
IF YOU CHECKED OFF ANY PROBLEMS ON THIS QUESTIONNAIRE, HOW DIFFICULT HAVE THESE PROBLEMS MADE IT FOR YOU TO DO YOUR WORK, TAKE CARE OF THINGS AT HOME, OR GET ALONG WITH OTHER PEOPLE: VERY DIFFICULT
5. BEING SO RESTLESS THAT IT IS HARD TO SIT STILL: SEVERAL DAYS
GAD7 TOTAL SCORE: 19
4. TROUBLE RELAXING: NEARLY EVERY DAY
1. FEELING NERVOUS, ANXIOUS, OR ON EDGE: NEARLY EVERY DAY
3. WORRYING TOO MUCH ABOUT DIFFERENT THINGS: NEARLY EVERY DAY

## 2023-11-10 ASSESSMENT — PATIENT HEALTH QUESTIONNAIRE - PHQ9
1. LITTLE INTEREST OR PLEASURE IN DOING THINGS: MORE THAN HALF THE DAYS
3. TROUBLE FALLING OR STAYING ASLEEP OR SLEEPING TOO MUCH: NOT AT ALL
4. FEELING TIRED OR HAVING LITTLE ENERGY: NEARLY EVERY DAY
8. MOVING OR SPEAKING SO SLOWLY THAT OTHER PEOPLE COULD HAVE NOTICED. OR THE OPPOSITE, BEING SO FIGETY OR RESTLESS THAT YOU HAVE BEEN MOVING AROUND A LOT MORE THAN USUAL: MORE THAN HALF THE DAYS
SUM OF ALL RESPONSES TO PHQ QUESTIONS 1-9: 17
SUM OF ALL RESPONSES TO PHQ9 QUESTIONS 1 AND 2: 4
7. TROUBLE CONCENTRATING ON THINGS, SUCH AS READING THE NEWSPAPER OR WATCHING TELEVISION: NEARLY EVERY DAY
2. FEELING DOWN, DEPRESSED OR HOPELESS: MORE THAN HALF THE DAYS
9. THOUGHTS THAT YOU WOULD BE BETTER OFF DEAD, OR OF HURTING YOURSELF: NOT AT ALL
10. IF YOU CHECKED OFF ANY PROBLEMS, HOW DIFFICULT HAVE THESE PROBLEMS MADE IT FOR YOU TO DO YOUR WORK, TAKE CARE OF THINGS AT HOME, OR GET ALONG WITH OTHER PEOPLE: VERY DIFFICULT
5. POOR APPETITE OR OVEREATING: MORE THAN HALF THE DAYS
6. FEELING BAD ABOUT YOURSELF - OR THAT YOU ARE A FAILURE OR HAVE LET YOURSELF OR YOUR FAMILY DOWN: NEARLY EVERY DAY

## 2023-11-10 NOTE — PROGRESS NOTES
Chief complaint:   Chief Complaint   Patient presents with    Anxiety    Depression       HPI:  Lexie Hartmann is a 32 y.o. female who presents for evaluation of anxiety and depression symptoms. She states she has a super short fuse and has been worried about a lot. She has a hard time letting things go in her mind that she worries about. She feels before her period she yells a lot. She has been on Zoloft in the past which helped but she had a headache in the middle of her head for which she had to take Tylenol. She last took Zoloft a few years ago.    Physical exam:  /74   LMP  (LMP Unknown)   General: NAD, well appearing female  Heart: RRR, no mumur appreciated  Lungs: CTAB, no wheezes, rales, rhonchi  Psych: alert and oriented, mood and affect congruent    Assessment/Plan   Problem List Items Addressed This Visit       Depression, recurrent (CMS/HCC) - Primary    Relevant Medications    escitalopram (Lexapro) 10 mg tablet    Other Relevant Orders    Referral to Psychology     Other Visit Diagnoses       Anxiety        Relevant Medications    escitalopram (Lexapro) 10 mg tablet    Other Relevant Orders    Referral to Psychology        Initiated Lexapro 10 mg PO daily  Referral to psychology placed  Follow up 4-6 weeks, sooner as needed    Racquel Amaya,

## 2023-11-15 ENCOUNTER — OFFICE VISIT (OUTPATIENT)
Dept: ORTHOPEDIC SURGERY | Facility: CLINIC | Age: 33
End: 2023-11-15
Payer: COMMERCIAL

## 2023-11-15 DIAGNOSIS — S92.352A DISPLACED FRACTURE OF FIFTH METATARSAL BONE, LEFT FOOT, INITIAL ENCOUNTER FOR CLOSED FRACTURE: Primary | ICD-10-CM

## 2023-11-15 PROCEDURE — 99024 POSTOP FOLLOW-UP VISIT: CPT | Performed by: PHYSICIAN ASSISTANT

## 2023-11-15 PROCEDURE — 1036F TOBACCO NON-USER: CPT | Performed by: PHYSICIAN ASSISTANT

## 2023-11-15 NOTE — PROGRESS NOTES
Patient comes in for post-op of left foot, NWB in cast and crutches. No pain. Taking ASA    Physical Exam:  the left foot : incisions clean/dry intact, calf soft and supple, toes pink and warm with good capillary refill, pulses intact, limb swelling appropriate, wiggles toes    Assessment: left foot 5th metatarsal ORIF 10/26/23  Plan:  - Cast and sutures removed  - Patient was placed in a tall CAM walker boot to be NWB with crutches.  They were given boot instructions.  - PT orders placed, instructed patient to call ahead to schedule appointment  - Patient will increase their dietary calcium intake (milk,yogurt) and should get 1200 mg of calcium per day. They will also take a vitamin D supplement.  - All the patient's questions were answered. The patient agrees with the above plan.  Follow up in 3 weeks with repeat left foot xrays with AP, lateral and oblique views to evaluate bone healing.    Patient's questions were answered in detail and they are agreeable to above plan.

## 2023-11-15 NOTE — PATIENT INSTRUCTIONS
YOUR BOOT INSTRUCTIONS:  You CANNOT put weight on your foot and ankle while in the boot.    You can use crutches or walker for support   You should wear boot when walking or standing   You can take off your boot while bathing, sitting, stretching, icing or doing therapy exercises.  You can take your boot off at nighttime while sleeping.    Keep your incision clean and dry    Patient will increase their dietary calcium intake (milk,yogurt) and should get 1200 mg of calcium per day. They will also take a vitamin D supplement.    Ice and elevate supported at the calf to reduce swelling    Follow up in 3 weeks

## 2023-11-16 ENCOUNTER — APPOINTMENT (OUTPATIENT)
Dept: ORTHOPEDIC SURGERY | Facility: CLINIC | Age: 33
End: 2023-11-16
Payer: COMMERCIAL

## 2023-12-07 ENCOUNTER — OFFICE VISIT (OUTPATIENT)
Dept: ORTHOPEDIC SURGERY | Facility: CLINIC | Age: 33
End: 2023-12-07
Payer: COMMERCIAL

## 2023-12-07 ENCOUNTER — ANCILLARY PROCEDURE (OUTPATIENT)
Dept: RADIOLOGY | Facility: CLINIC | Age: 33
End: 2023-12-07
Payer: COMMERCIAL

## 2023-12-07 DIAGNOSIS — S92.352A DISPLACED FRACTURE OF FIFTH METATARSAL BONE, LEFT FOOT, INITIAL ENCOUNTER FOR CLOSED FRACTURE: ICD-10-CM

## 2023-12-07 PROCEDURE — 99024 POSTOP FOLLOW-UP VISIT: CPT | Performed by: PHYSICIAN ASSISTANT

## 2023-12-07 PROCEDURE — 73630 X-RAY EXAM OF FOOT: CPT | Mod: LT,FY

## 2023-12-07 PROCEDURE — 73630 X-RAY EXAM OF FOOT: CPT | Mod: LEFT SIDE | Performed by: RADIOLOGY

## 2023-12-07 PROCEDURE — 1036F TOBACCO NON-USER: CPT | Performed by: PHYSICIAN ASSISTANT

## 2023-12-07 NOTE — PROGRESS NOTES
Patient comes in for post-op of left foot, NWB in boot and crutches. No pain. Taking ASA    Physical Exam:  the left foot : incision healing well., calf soft and supple, toes pink and warm with good capillary refill, pulses intact, limb swelling appropriate, wiggles toes. No ttp    I personally reviewed the patient's x-ray images and reports of the left foot. The xrays show that hardware is intact and in appropriate alignment.  Normal joint spacing      Assessment: left foot 5th metatarsal ORIF 10/26/23  Plan:  - Patient is doing well  - Patient was placed in a tall CAM walker boot to be WBAT with crutches.  They were given boot instructions.  - The patient was given a prescription for physical therapy.  Physical therapy is medically necessary to improve strength, balance, range of motion and functional outcomes after injury and/or surgery.   - Patient will increase their dietary calcium intake (milk,yogurt) and should get 1200 mg of calcium per day. They will also take a vitamin D supplement.  - All the patient's questions were answered. The patient agrees with the above plan.  Follow up in 3 weeks with repeat left foot xrays with AP, lateral and oblique views to evaluate bone healing.

## 2023-12-07 NOTE — PATIENT INSTRUCTIONS
YOUR BOOT INSTRUCTIONS:  You can start to put weight on your heel while in the boot; begin with 25% weight and increase by 25% every 3 days if no pain.  Use crutches or walker for support as needed.   You should wear boot when walking or standing   You can take off your boot while bathing, sitting, stretching, icing or doing therapy exercises.  You can take your boot off at nighttime while sleeping.    You can use a bucket of room temperature water with Epson salt and do your ankle/toe exercises    You can also use OTC aspercreme ointment and apply it to the injured area.      Ice and elevate supported at the calf to reduce swelling    The patient was given a prescription for physical therapy.  Physical therapy is medically necessary to improve strength, balance, range of motion and functional outcomes after injury and/or surgery.    Patient will increase their dietary calcium intake (milk,yogurt) and should get 1200 mg of calcium per day. They will also take a vitamin D supplement.    1. Follow stretching exercises that were on a separate handout   2. Hold each stretch for a least 1 minute  3. Do not bounce while stretching  4. Stretch for 10 minutes at a time, 3x a day for 6 weeks then daily  5. Remember, it takes several weeks to a few months of consistent stretching to increase flexibility and decrease symptoms.       Follow up in 2-3 weeks

## 2023-12-20 ENCOUNTER — APPOINTMENT (OUTPATIENT)
Dept: RADIOLOGY | Facility: CLINIC | Age: 33
End: 2023-12-20
Payer: COMMERCIAL

## 2023-12-20 ENCOUNTER — APPOINTMENT (OUTPATIENT)
Dept: ORTHOPEDIC SURGERY | Facility: CLINIC | Age: 33
End: 2023-12-20
Payer: COMMERCIAL

## 2023-12-21 ENCOUNTER — OFFICE VISIT (OUTPATIENT)
Dept: ORTHOPEDIC SURGERY | Facility: CLINIC | Age: 33
End: 2023-12-21
Payer: COMMERCIAL

## 2023-12-21 ENCOUNTER — ANCILLARY PROCEDURE (OUTPATIENT)
Dept: RADIOLOGY | Facility: CLINIC | Age: 33
End: 2023-12-21
Payer: COMMERCIAL

## 2023-12-21 DIAGNOSIS — S92.352A DISPLACED FRACTURE OF FIFTH METATARSAL BONE, LEFT FOOT, INITIAL ENCOUNTER FOR CLOSED FRACTURE: Primary | ICD-10-CM

## 2023-12-21 DIAGNOSIS — S92.352A DISPLACED FRACTURE OF FIFTH METATARSAL BONE, LEFT FOOT, INITIAL ENCOUNTER FOR CLOSED FRACTURE: ICD-10-CM

## 2023-12-21 PROCEDURE — 99024 POSTOP FOLLOW-UP VISIT: CPT | Performed by: PHYSICIAN ASSISTANT

## 2023-12-21 PROCEDURE — 1036F TOBACCO NON-USER: CPT | Performed by: PHYSICIAN ASSISTANT

## 2023-12-21 PROCEDURE — 73630 X-RAY EXAM OF FOOT: CPT | Mod: LT

## 2023-12-21 PROCEDURE — 73630 X-RAY EXAM OF FOOT: CPT | Mod: LEFT SIDE | Performed by: RADIOLOGY

## 2023-12-21 NOTE — PROGRESS NOTES
Patient comes in for post-op of left foot, WBAT in boot.  No pain. Doing HEP. Getting calcium and vit D    Physical Exam:  the left foot : incision is healed. , calf soft and supple, toes pink and warm with good capillary refill, pulses intact, limb swelling appropriate, wiggles toes. No ttp.     Assessment: left foot 5th metatarsal ORIF 10/26/23  Plan:  - Patient is doing well.   - Patient was placed in a tall CAM walker boot to be WBAT with crutches.  They were given boot instructions.  - The patient was given a prescription for physical therapy.  Physical therapy is medically necessary to improve strength, balance, range of motion and functional outcomes after injury and/or surgery.  - She can progress out of boot into athletic shoe with carbon fiber plate insole.   - Patient will increase their dietary calcium intake (milk,yogurt) and should get 1200 mg of calcium per day. They will also take a vitamin D supplement.  - All the patient's questions were answered. The patient agrees with the above plan.  Follow up in 4 weeks with repeat left foot xrays with AP, lateral and oblique views to evaluate bone healing.    Patient's questions were answered in detail and they are agreeable to above plan.

## 2023-12-21 NOTE — PATIENT INSTRUCTIONS
YOUR BOOT INSTRUCTIONS:  You can put weight on your foot and ankle while in the boot.    You can use crutches or walker for support as needed.   You should wear boot when walking or standing   You can take off your boot while bathing, sitting, stretching, icing or doing therapy exercises.  You can take your boot off at nighttime while sleeping.    BOOT WEANING:  DAY 1-- come out of boot for 1 hour (wear supportive athletic shoes and orthotic inserts), rest of the day in boot  DAY 2-- come out of boot for 2 hours (wear supportive athletic shoes and orthotic inserts), rest of the day in boot  DAY 3-- come out of boot for 3 hours (wear supportive athletic shoes and orthotic inserts), rest of the day in boot  DAY 4-- come out of boot for 4 hours (wear supportive athletic shoes and orthotic inserts), rest of the day in boot  DAY 5-- come out of boot and wear supportive shoes and orthotic inserts  * if you have pain while weaning out of boot then go back one day in the protocol (i.e. If really sore on the morning of Day 3 from coming out of boot for 2 hours the previous day, go back to Day 1 and start again through the protocol)    You can also use OTC Voltaren gel or  aspercreme ointment and apply it to the injured area.      Ice and elevate supported at the calf to reduce swelling    You can get a full length carbon fiber plate orthotic to wear in your athletic shoes    Patient will increase their dietary calcium intake (milk,yogurt) and should get 1200 mg of calcium per day. They will also take a vitamin D supplement.    Follow up in 4 weeks

## 2024-01-15 ENCOUNTER — PATIENT MESSAGE (OUTPATIENT)
Dept: PRIMARY CARE | Facility: CLINIC | Age: 34
End: 2024-01-15
Payer: COMMERCIAL

## 2024-01-15 DIAGNOSIS — F41.9 ANXIETY: ICD-10-CM

## 2024-01-15 DIAGNOSIS — F33.9 DEPRESSION, RECURRENT (CMS-HCC): ICD-10-CM

## 2024-01-16 RX ORDER — ESCITALOPRAM OXALATE 10 MG/1
10 TABLET ORAL DAILY
Qty: 30 TABLET | Refills: 1 | Status: SHIPPED | OUTPATIENT
Start: 2024-01-16 | End: 2024-01-23 | Stop reason: SDUPTHER

## 2024-01-16 RX ORDER — ESCITALOPRAM OXALATE 10 MG/1
10 TABLET ORAL DAILY
Qty: 30 TABLET | Refills: 1 | Status: SHIPPED | OUTPATIENT
Start: 2024-01-16 | End: 2024-01-16 | Stop reason: SDUPTHER

## 2024-01-23 ENCOUNTER — OFFICE VISIT (OUTPATIENT)
Dept: PRIMARY CARE | Facility: CLINIC | Age: 34
End: 2024-01-23
Payer: COMMERCIAL

## 2024-01-23 ENCOUNTER — OFFICE VISIT (OUTPATIENT)
Dept: ORTHOPEDIC SURGERY | Facility: CLINIC | Age: 34
End: 2024-01-23
Payer: COMMERCIAL

## 2024-01-23 ENCOUNTER — HOSPITAL ENCOUNTER (OUTPATIENT)
Dept: RADIOLOGY | Facility: CLINIC | Age: 34
Discharge: HOME | End: 2024-01-23
Payer: COMMERCIAL

## 2024-01-23 VITALS — DIASTOLIC BLOOD PRESSURE: 62 MMHG | SYSTOLIC BLOOD PRESSURE: 110 MMHG | TEMPERATURE: 99.1 F

## 2024-01-23 DIAGNOSIS — S92.352A DISPLACED FRACTURE OF FIFTH METATARSAL BONE, LEFT FOOT, INITIAL ENCOUNTER FOR CLOSED FRACTURE: ICD-10-CM

## 2024-01-23 DIAGNOSIS — Z00.00 WELLNESS EXAMINATION: Primary | ICD-10-CM

## 2024-01-23 DIAGNOSIS — F41.9 ANXIETY: ICD-10-CM

## 2024-01-23 DIAGNOSIS — F33.9 DEPRESSION, RECURRENT (CMS-HCC): ICD-10-CM

## 2024-01-23 PROCEDURE — 73630 X-RAY EXAM OF FOOT: CPT | Mod: LT

## 2024-01-23 PROCEDURE — 3078F DIAST BP <80 MM HG: CPT | Performed by: FAMILY MEDICINE

## 2024-01-23 PROCEDURE — 1036F TOBACCO NON-USER: CPT | Performed by: ORTHOPAEDIC SURGERY

## 2024-01-23 PROCEDURE — 73630 X-RAY EXAM OF FOOT: CPT | Mod: LEFT SIDE | Performed by: RADIOLOGY

## 2024-01-23 PROCEDURE — 1036F TOBACCO NON-USER: CPT | Performed by: FAMILY MEDICINE

## 2024-01-23 PROCEDURE — 99024 POSTOP FOLLOW-UP VISIT: CPT | Performed by: ORTHOPAEDIC SURGERY

## 2024-01-23 PROCEDURE — 99214 OFFICE O/P EST MOD 30 MIN: CPT | Performed by: FAMILY MEDICINE

## 2024-01-23 PROCEDURE — 3074F SYST BP LT 130 MM HG: CPT | Performed by: FAMILY MEDICINE

## 2024-01-23 RX ORDER — ESCITALOPRAM OXALATE 10 MG/1
10 TABLET ORAL DAILY
Qty: 90 TABLET | Refills: 1 | Status: SHIPPED | OUTPATIENT
Start: 2024-01-23 | End: 2024-07-21

## 2024-01-23 NOTE — PROGRESS NOTES
Chief complaint:   Chief Complaint   Patient presents with    Med Refill       HPI:  Lexie Hartmann is a 33 y.o. female who presents for evaluation of Lexapro. She is feeling much better on this dosing. She is interested in starting therapy but has not yet. She is not currently exercising. She will be seeing ortho today. She denies alcohol use. No tobacco, no drug use. Her biggest support person is boyfriend and mom currently.    Physical exam:  /62 (BP Location: Left arm, Patient Position: Sitting)   Temp 37.3 °C (99.1 °F)   General: NAD, well appearing female  Heart: RRR, no mumur appreciated  Lungs: CTAB, no wheezes, rales, rhonchi  Psych: alert and oriented, mood and affect congruent    Assessment/Plan   Problem List Items Addressed This Visit       Depression, recurrent (CMS/HCC)     - well controlled with Lexapro 10 mg PO daily   - continue current dosing  - follow up 6 mo         Relevant Medications    escitalopram (Lexapro) 10 mg tablet     Other Visit Diagnoses       Wellness examination    -  Primary    Relevant Orders    Comprehensive Metabolic Panel    Lipid Panel    CBC    Anxiety        Relevant Medications    escitalopram (Lexapro) 10 mg tablet        Discussed obtaining fasting labs prior to her physical which is to be scheduled in the next few months. Labs were ordered. She will be due for her PAP/HPV testing which was discussed (3/2024) and is due for her Tetanus booster for which she defers to future appointment.     Racquel Aamya,

## 2024-01-23 NOTE — PROGRESS NOTES
Patient comes in for post-op of left foot, WBAT in Ugg boots usually wearing tennis shoes.  No pain. Doing HEP. Getting calcium and vit D    Physical Exam:  the left foot : incision is healed. , calf soft and supple, toes pink and warm with good capillary refill, pulses intact, limb swelling appropriate, wiggles toes. No ttp. Good balance    Assessment: left foot 5th metatarsal ORIF 10/26/23  Plan:  - Patient is doing well.   - WBAT in tennis shoes.  - The patient was given a prescription for physical therapy.  Physical therapy is medically necessary to improve strength, balance, range of motion and functional outcomes after injury and/or surgery.  - She can progress out of boot into athletic shoe with carbon fiber plate insole.   - Patient will increase their dietary calcium intake (milk,yogurt) and should get 1200 mg of calcium per day. They will also take a vitamin D supplement.  - All the patient's questions were answered. The patient agrees with the above plan.  Follow up as needed

## 2024-01-23 NOTE — PATIENT INSTRUCTIONS
1. Follow stretching exercises that were on a separate handout   2. Hold each stretch for a least 1 minute  3. Do not bounce while stretching  4. Stretch for 10 minutes at a time, 3x a day for 6 weeks then daily  5. Remember, it takes several weeks to a few months of consistent stretching to increase flexibility and decrease symptoms.     You can use OTC Voltaren gel or aspercream and apply it to the injured area.    Ice and elevate supported at the calf with no pressure on the heel to reduce swelling.    Patient will increase their dietary calcium intake (milk,yogurt) and should get 1200 mg of calcium per day. They will also take a vitamin D supplement.    When returning to walking:  - Wear good supportive athletic shoes  - Start with a 5 minute walk and increase by ~10% in time or distance per week  - Make sure to cross train; avoid impact (walking,jumping,running) exercises on consecutive days     Follow up as needed

## 2024-08-25 DIAGNOSIS — F41.9 ANXIETY: ICD-10-CM

## 2024-08-25 DIAGNOSIS — F33.9 DEPRESSION, RECURRENT (CMS-HCC): ICD-10-CM

## 2024-08-26 RX ORDER — ESCITALOPRAM OXALATE 10 MG/1
10 TABLET ORAL DAILY
Qty: 90 TABLET | Refills: 1 | OUTPATIENT
Start: 2024-08-26 | End: 2025-02-22

## 2024-09-27 ENCOUNTER — PATIENT MESSAGE (OUTPATIENT)
Dept: PRIMARY CARE | Facility: CLINIC | Age: 34
End: 2024-09-27
Payer: COMMERCIAL

## 2024-09-27 DIAGNOSIS — F33.9 DEPRESSION, RECURRENT (CMS-HCC): ICD-10-CM

## 2024-09-27 DIAGNOSIS — F41.9 ANXIETY: ICD-10-CM

## 2024-10-01 RX ORDER — ESCITALOPRAM OXALATE 10 MG/1
10 TABLET ORAL DAILY
Qty: 90 TABLET | Refills: 0 | Status: SHIPPED | OUTPATIENT
Start: 2024-10-01

## 2024-10-16 ENCOUNTER — APPOINTMENT (OUTPATIENT)
Dept: PRIMARY CARE | Facility: CLINIC | Age: 34
End: 2024-10-16
Payer: COMMERCIAL

## 2024-10-16 DIAGNOSIS — F41.9 ANXIETY: ICD-10-CM

## 2024-10-16 DIAGNOSIS — F33.9 DEPRESSION, RECURRENT (CMS-HCC): ICD-10-CM

## 2024-10-16 DIAGNOSIS — F41.1 GENERALIZED ANXIETY DISORDER: Primary | ICD-10-CM

## 2024-10-16 PROBLEM — H66.40 OTITIS MEDIA, PURULENT: Status: RESOLVED | Noted: 2023-10-03 | Resolved: 2024-10-16

## 2024-10-16 PROBLEM — H60.90 OTITIS EXTERNA: Status: RESOLVED | Noted: 2023-10-03 | Resolved: 2024-10-16

## 2024-10-16 PROCEDURE — 1036F TOBACCO NON-USER: CPT | Performed by: FAMILY MEDICINE

## 2024-10-16 PROCEDURE — 99213 OFFICE O/P EST LOW 20 MIN: CPT | Performed by: FAMILY MEDICINE

## 2024-10-16 RX ORDER — ESCITALOPRAM OXALATE 10 MG/1
10 TABLET ORAL DAILY
Qty: 90 TABLET | Refills: 3 | Status: SHIPPED | OUTPATIENT
Start: 2024-10-16

## 2024-10-16 NOTE — PROGRESS NOTES
Chief complaint: depression    Virtual or Telephone Consent    An interactive audio and video telecommunication system which permits real time communications between the patient (at the originating site) and provider (at the distant site) was utilized to provide this telehealth service.   Verbal consent was requested and obtained from Lexie Hartmann on this date, 10/16/24 for a telehealth visit.       HPI:  Lexie Hartmann is a 33 y.o. female who presents for evaluation of Lexapro. She is doing well with her current dosing and interested in continuing this medication. She is sleeping well, eating well, and exercising thought reports she could be better nichelle this. Her anxiety and depression are well controlled and she has no SE of medications.     Physical exam:  There were no vitals taken for this visit.  General: NAD, well appearing female    Assessment/Plan   Problem List Items Addressed This Visit       Depression, recurrent (CMS-HCC)    Relevant Medications    escitalopram (Lexapro) 10 mg tablet    Generalized anxiety disorder - Primary     Other Visit Diagnoses       Anxiety        Relevant Medications    escitalopram (Lexapro) 10 mg tablet        Continue Lexapro 10 mg PO daily, schedule her physical when due. Recommend her to get her labs completed.     Racquel Amaya, DO

## (undated) DEVICE — CORD, CAUTERY, BIOPOLAR FORCEP, 12FT

## (undated) DEVICE — GLOVE, SURGICAL, PROTEXIS PI ORTHO, 7.0, PF, LF

## (undated) DEVICE — GLOVE, SURGICAL, SYNTHETIC, DERMAPRENE, 7, PF, LF, GREEN

## (undated) DEVICE — Device

## (undated) DEVICE — BANDAGE, ELASTIC, REINFORCED, ECONO-WRAP, 4 IN X 4.5 YD, LATEX

## (undated) DEVICE — SUTURE, CTD, VICRYL, 2-0, UND, BR, CT-2

## (undated) DEVICE — TOWEL, SURGICAL, NEURO, O/R, 16 X 26, BLUE, STERILE

## (undated) DEVICE — PADDING, WEBRIL, UNDERCAST, STERILE, 4 IN

## (undated) DEVICE — DRAPE, TOWEL, STERI DRAPE, 17 X 11 IN, PLASTIC, STERILE

## (undated) DEVICE — SUTURE, VICRYL, 2-0, 27 IN, SH, UNDYED

## (undated) DEVICE — COVER HANDLE LIGHT, STERIS, BLUE, STERILE

## (undated) DEVICE — SUTURE, ETHILON, 4-0, 18, PS2, BLACK

## (undated) DEVICE — BANDAGE, ELASTIC, REINFORCED, ECONO-WRAP, 6 IN X 4.5 YD, LATEX

## (undated) DEVICE — SYRINGE, 60 CC, IRRIGATION, BULB, CONTRO-BULB, PAPER POUCH

## (undated) DEVICE — BLADE, OPHTHALMIC, BEAVER, MINI, SHARP ONE SIDE

## (undated) DEVICE — SUTURE, VICRYL, 4-0, 18 IN, UNDYED BR PS-2

## (undated) DEVICE — DRESSING, ABDOMINAL, TENDERSORB, 8 X 10 IN, STERILE

## (undated) DEVICE — PREP TRAY, VAGINAL

## (undated) DEVICE — PADDING, WEBRIL, UNDERCAST, STERILE, 6 IN

## (undated) DEVICE — DRAPE, SHEET, FAN FOLDED, MEDIUM, 44 X 72 IN, DISPOSABLE, LF, STERILE